# Patient Record
Sex: MALE | Race: WHITE | Employment: OTHER | URBAN - METROPOLITAN AREA
[De-identification: names, ages, dates, MRNs, and addresses within clinical notes are randomized per-mention and may not be internally consistent; named-entity substitution may affect disease eponyms.]

---

## 2018-08-10 ENCOUNTER — HOSPITAL ENCOUNTER (OUTPATIENT)
Age: 78
Discharge: HOME | End: 2018-08-10
Attending: FAMILY MEDICINE

## 2018-08-10 VITALS
DIASTOLIC BLOOD PRESSURE: 72 MMHG | SYSTOLIC BLOOD PRESSURE: 116 MMHG | TEMPERATURE: 98 F | HEART RATE: 73 BPM | RESPIRATION RATE: 18 BRPM | OXYGEN SATURATION: 97 %

## 2018-08-10 DIAGNOSIS — R10.33 ABDOMINAL PAIN, PERIUMBILICAL: Primary | ICD-10-CM

## 2018-08-10 PROCEDURE — 99202 OFFICE O/P NEW SF 15 MIN: CPT

## 2018-08-10 NOTE — ED PROVIDER NOTES
Pt seen in [facility] [place]      Stated Complaint: Patient presents with:  Abdomen/Flank Pain (GI/)      --------------   RN assessment:     abd pain, vomiting x I d  --------------    CC: abdominal pain    HPI:  Jhonathan Young %  O2 Device: None (Room air)   You had elevated blood pressure today and you need to follow up with your doctor for a repeat blood pressure check and further discussion of lifestyle modifications that include Weight Reduction - Dietary Sodium Restriction encounter diagnosis)    Disposition: ER    Ic to ed    Follow-up:    No follow-up provider specified. Medications Prescribed:    There are no discharge medications for this patient.